# Patient Record
Sex: MALE | Race: WHITE | NOT HISPANIC OR LATINO | Employment: UNEMPLOYED | URBAN - METROPOLITAN AREA
[De-identification: names, ages, dates, MRNs, and addresses within clinical notes are randomized per-mention and may not be internally consistent; named-entity substitution may affect disease eponyms.]

---

## 2021-03-10 PROBLEM — L40.50 PSORIATIC ARTHRITIS: Status: ACTIVE | Noted: 2021-03-10

## 2021-03-10 PROBLEM — M79.642 PAIN IN BOTH HANDS: Status: ACTIVE | Noted: 2021-03-10

## 2021-03-10 PROBLEM — M79.671 PAIN IN BOTH FEET: Status: ACTIVE | Noted: 2021-03-10

## 2021-03-10 PROBLEM — M54.9 DORSALGIA, UNSPECIFIED: Status: ACTIVE | Noted: 2021-03-10

## 2021-03-10 PROBLEM — M79.641 PAIN IN BOTH HANDS: Status: ACTIVE | Noted: 2021-03-10

## 2021-03-10 PROBLEM — M79.672 PAIN IN BOTH FEET: Status: ACTIVE | Noted: 2021-03-10

## 2021-03-10 PROBLEM — L40.9 PSORIASIS: Status: ACTIVE | Noted: 2021-03-10

## 2021-03-10 PROBLEM — M25.569 KNEE PAIN: Status: ACTIVE | Noted: 2021-03-10

## 2021-03-25 ENCOUNTER — SPECIALTY PHARMACY (OUTPATIENT)
Dept: PHARMACY | Facility: CLINIC | Age: 46
End: 2021-03-25

## 2021-03-26 ENCOUNTER — SPECIALTY PHARMACY (OUTPATIENT)
Dept: PHARMACY | Facility: CLINIC | Age: 46
End: 2021-03-26

## 2021-04-23 ENCOUNTER — SPECIALTY PHARMACY (OUTPATIENT)
Dept: PHARMACY | Facility: CLINIC | Age: 46
End: 2021-04-23

## 2021-05-18 ENCOUNTER — SPECIALTY PHARMACY (OUTPATIENT)
Dept: PHARMACY | Facility: CLINIC | Age: 46
End: 2021-05-18

## 2021-06-15 ENCOUNTER — SPECIALTY PHARMACY (OUTPATIENT)
Dept: PHARMACY | Facility: CLINIC | Age: 46
End: 2021-06-15

## 2021-07-13 ENCOUNTER — SPECIALTY PHARMACY (OUTPATIENT)
Dept: PHARMACY | Facility: CLINIC | Age: 46
End: 2021-07-13

## 2021-08-10 ENCOUNTER — SPECIALTY PHARMACY (OUTPATIENT)
Dept: PHARMACY | Facility: CLINIC | Age: 46
End: 2021-08-10

## 2021-09-13 ENCOUNTER — SPECIALTY PHARMACY (OUTPATIENT)
Dept: PHARMACY | Facility: CLINIC | Age: 46
End: 2021-09-13

## 2021-10-14 ENCOUNTER — SPECIALTY PHARMACY (OUTPATIENT)
Dept: PHARMACY | Facility: CLINIC | Age: 46
End: 2021-10-14

## 2021-11-05 ENCOUNTER — SPECIALTY PHARMACY (OUTPATIENT)
Dept: PHARMACY | Facility: CLINIC | Age: 46
End: 2021-11-05

## 2021-12-07 ENCOUNTER — SPECIALTY PHARMACY (OUTPATIENT)
Dept: PHARMACY | Facility: CLINIC | Age: 46
End: 2021-12-07

## 2022-01-07 ENCOUNTER — SPECIALTY PHARMACY (OUTPATIENT)
Dept: PHARMACY | Facility: CLINIC | Age: 47
End: 2022-01-07

## 2022-01-07 NOTE — TELEPHONE ENCOUNTER
Specialty Pharmacy - Refill Coordination    Specialty Medication Orders Linked to Encounter    Flowsheet Row Most Recent Value   Medication #1 etanercept (ENBREL SURECLICK) 50 mg/mL (1 mL) (Order#[id number], Rx#[id number])          Refill Questions - Documented Responses    Flowsheet Row Most Recent Value   Patient Availability and HIPAA Verification    Does patient want to proceed with activity? Yes   HIPAA/medical authority confirmed? Yes   Relationship to patient of person spoken to? Self   Refill Screening Questions    Changes to allergies? No   Changes to medications? No   New conditions since last clinic visit? No   Unplanned office visit, urgent care, ED, or hospital admission in the last 4 weeks? No   How does patient/caregiver feel medication is working? Good   Financial problems or insurance changes? No   How many doses of your specialty medications were missed in the last 4 weeks? 0   Would patient like to speak to a pharmacist? No   When does the patient need to receive the medication? 01/16/22   Refill Delivery Questions    How will the patient receive the medication? Mail   When does the patient need to receive the medication? 01/16/22   Shipping Address Home   Address in [facility] confirmed and updated if neccessary? Yes   Expected Copay ($) 0   Is the patient able to afford the medication copay? Yes   Payment Method zero copay   Days supply of Refill 28   Supplies needed? No supplies needed   Refill activity completed? Yes   Refill activity plan Refill scheduled   Shipment/Pickup Date: 01/10/22          Current Outpatient Medications   Medication Sig    atorvastatin (LIPITOR) 20 MG tablet atorvastatin 20 mg tablet   TAKE ONE TABLET BY MOUTH AT BEDTIME    etanercept (ENBREL SURECLICK) 50 mg/mL (1 mL) Inject 1 mL (50 mg total) into the skin once a week.    folic acid (FOLVITE) 1 MG tablet Take 1 tablet (1 mg total) by mouth once daily.    gabapentin (NEURONTIN) 800 MG tablet gabapentin 800  mg tablet   TAKE ONE CAPSULE BY MOUTH THREE TIMES DAILY    ibuprofen (ADVIL,MOTRIN) 800 MG tablet ibuprofen 800 mg tablet   TAKE 1 TABLET BY MOUTH EVERY 6 HOURS AS NEEDED    lisinopriL-hydrochlorothiazide (PRINZIDE,ZESTORETIC) 20-12.5 mg per tablet lisinopril 20 mg-hydrochlorothiazide 12.5 mg tablet   TAKE ONE TABLET BY MOUTH ONCE DAILY    methotrexate 2.5 MG Tab Take 4 tablets (10 mg total) by mouth every 7 days.    omeprazole (PRILOSEC) 40 MG capsule omeprazole 40 mg capsule,delayed release   TAKE ONE CAPSULE BY MOUTH ONCE DAILY    risperiDONE (RISPERDAL) 2 MG tablet risperidone 2 mg tablet   TAKE ONE TABLET BY MOUTH EVERY NIGHT AT BEDTIME    triamcinolone acetonide 0.1% (KENALOG) 0.1 % ointment Apply a thin layer on the affected area as needed. 2 weeks on/2 weeks off   Last reviewed on 3/26/2021 12:40 PM by Amalia Guerrero PharmD    Review of patient's allergies indicates:  No Known Allergies Last reviewed on  3/22/2021 11:06 AM by Luci Delatorre      Tasks added this encounter   No tasks added.   Tasks due within next 3 months   1/7/2022 - Refill Call (Auto Added)     Elvin Chiang, YonyD  [facility]  [address] [place] [id number]  Phone: [phone]  Fax: [phone]

## 2022-02-07 ENCOUNTER — SPECIALTY PHARMACY (OUTPATIENT)
Dept: PHARMACY | Facility: CLINIC | Age: 47
End: 2022-02-07

## 2022-02-07 NOTE — TELEPHONE ENCOUNTER
Specialty Pharmacy - Refill Coordination    Specialty Medication Orders Linked to Encounter    Flowsheet Row Most Recent Value   Medication #1 etanercept (ENBREL SURECLICK) 50 mg/mL (1 mL) (Order#[id number], Rx#[id number])          Refill Questions - Documented Responses    Flowsheet Row Most Recent Value   Patient Availability and HIPAA Verification    Does patient want to proceed with activity? Yes   HIPAA/medical authority confirmed? Yes   Relationship to patient of person spoken to? Self   Refill Screening Questions    Changes to allergies? No   Changes to medications? No   New conditions since last clinic visit? No   Unplanned office visit, urgent care, ED, or hospital admission in the last 4 weeks? No   How does patient/caregiver feel medication is working? Good   Financial problems or insurance changes? No   How many doses of your specialty medications were missed in the last 4 weeks? 0   Would patient like to speak to a pharmacist? No   When does the patient need to receive the medication? 02/14/22   Refill Delivery Questions    How will the patient receive the medication? Mail   When does the patient need to receive the medication? 02/14/22   Shipping Address Home   Address in [facility] confirmed and updated if neccessary? Yes   Expected Copay ($) 0   Is the patient able to afford the medication copay? Yes   Payment Method CC on file   Days supply of Refill 28   Supplies needed? No supplies needed   Refill activity completed? Yes   Refill activity plan Refill scheduled   Shipment/Pickup Date: 02/09/22          Current Outpatient Medications   Medication Sig    atorvastatin (LIPITOR) 20 MG tablet atorvastatin 20 mg tablet   TAKE ONE TABLET BY MOUTH AT BEDTIME    etanercept (ENBREL SURECLICK) 50 mg/mL (1 mL) Inject 1 mL (50 mg total) into the skin once a week.    folic acid (FOLVITE) 1 MG tablet Take 1 tablet (1 mg total) by mouth once daily.    gabapentin (NEURONTIN) 800 MG tablet gabapentin 800  mg tablet   TAKE ONE CAPSULE BY MOUTH THREE TIMES DAILY    ibuprofen (ADVIL,MOTRIN) 800 MG tablet ibuprofen 800 mg tablet   TAKE 1 TABLET BY MOUTH EVERY 6 HOURS AS NEEDED    lisinopriL-hydrochlorothiazide (PRINZIDE,ZESTORETIC) 20-12.5 mg per tablet lisinopril 20 mg-hydrochlorothiazide 12.5 mg tablet   TAKE ONE TABLET BY MOUTH ONCE DAILY    methotrexate 2.5 MG Tab Take 4 tablets (10 mg total) by mouth every 7 days.    omeprazole (PRILOSEC) 40 MG capsule omeprazole 40 mg capsule,delayed release   TAKE ONE CAPSULE BY MOUTH ONCE DAILY    risperiDONE (RISPERDAL) 2 MG tablet risperidone 2 mg tablet   TAKE ONE TABLET BY MOUTH EVERY NIGHT AT BEDTIME    triamcinolone acetonide 0.1% (KENALOG) 0.1 % ointment Apply a thin layer on the affected area as needed. 2 weeks on/2 weeks off   Last reviewed on 3/26/2021 12:40 PM by Amalia Guerrero, PharmD    Review of patient's allergies indicates:  No Known Allergies Last reviewed on  3/22/2021 11:06 AM by Luci Delatorre      Tasks added this encounter   3/7/2022 - Refill Call (Auto Added)   Tasks due within next 3 months   No tasks due.     [facility]  [address] [place] [id number]  Phone: [phone]  Fax: [phone]

## 2022-03-07 ENCOUNTER — SPECIALTY PHARMACY (OUTPATIENT)
Dept: PHARMACY | Facility: CLINIC | Age: 47
End: 2022-03-07

## 2022-03-08 NOTE — TELEPHONE ENCOUNTER
Patient has 2 injections left on hand, will need next shipment by 3/24. Refill request sent, will follow-up next week,

## 2022-03-15 NOTE — TELEPHONE ENCOUNTER
Outgoing call regarding Enbrel refill. Refill request was denied by [facility] due to pt needing appointment. Called pt and He understands MD denied refill and is going to follow up with MD on 3/16/22. Routing to assigned [facility] Amalia

## 2022-03-18 NOTE — TELEPHONE ENCOUNTER
No active Rx on file.  MD[facility] denied refill.  Pt aware.  Will de-enrolling and re-enroll once Rx received.